# Patient Record
Sex: FEMALE | Race: WHITE | NOT HISPANIC OR LATINO | Employment: OTHER | URBAN - METROPOLITAN AREA
[De-identification: names, ages, dates, MRNs, and addresses within clinical notes are randomized per-mention and may not be internally consistent; named-entity substitution may affect disease eponyms.]

---

## 2020-05-29 ENCOUNTER — OFFICE VISIT (OUTPATIENT)
Dept: FAMILY MEDICINE CLINIC | Facility: CLINIC | Age: 60
End: 2020-05-29

## 2020-05-29 VITALS
OXYGEN SATURATION: 98 % | SYSTOLIC BLOOD PRESSURE: 140 MMHG | RESPIRATION RATE: 16 BRPM | WEIGHT: 147 LBS | TEMPERATURE: 97.8 F | BODY MASS INDEX: 22.28 KG/M2 | HEART RATE: 94 BPM | DIASTOLIC BLOOD PRESSURE: 80 MMHG | HEIGHT: 68 IN

## 2020-05-29 DIAGNOSIS — L72.3 INFECTED SEBACEOUS CYST: Primary | ICD-10-CM

## 2020-05-29 DIAGNOSIS — L08.9 INFECTED SEBACEOUS CYST: Primary | ICD-10-CM

## 2020-05-29 PROCEDURE — 99204 OFFICE O/P NEW MOD 45 MIN: CPT | Performed by: INTERNAL MEDICINE

## 2020-05-29 PROCEDURE — 10060 I&D ABSCESS SIMPLE/SINGLE: CPT | Performed by: INTERNAL MEDICINE

## 2020-05-29 RX ORDER — CEPHALEXIN 500 MG/1
500 CAPSULE ORAL 3 TIMES DAILY
Qty: 30 CAPSULE | Refills: 0 | OUTPATIENT
Start: 2020-05-29 | End: 2021-04-23

## 2020-05-29 NOTE — PROGRESS NOTES
Subjective   Mya Bradford is a 60 y.o. female. Patient is here today for establishing care again as a new patient.  She has 2 complaints.  She has had a couple of episodes of a sharp sudden approximately 30 second pain high up in the right abdomen at the costochondral junction with no nausea vomiting anorexia or other GI symptoms.  Secondly she has a sore lesion on her back.  Otherwise she has been feeling healthy and has not seen a doctor for a while.  She is on no regular medications  PMH she has a history of uterine myomectomy for fibroids in  and no other operations or hospitalizations.  FH-patient's parents are both living, father 85 with hypertension and mother 87 with thyroid problems 1 sister  of unknown causes no other family diseases  SH-patient is single is never used tobacco products and has an occasional alcoholic drink.  She is a   Chief Complaint   Patient presents with   • Flank Pain     right side          Vitals:    20 0931   BP: 140/80   Pulse: 94   Resp: 16   Temp: 97.8 °F (36.6 °C)   SpO2: 98%     Body mass index is 22.68 kg/m².  The following portions of the patient's history were reviewed and updated as appropriate: allergies, current medications, past family history, past medical history, past social history, past surgical history and problem list.    No past medical history on file.   Allergies   Allergen Reactions   • Erythromycin Swelling      Social History     Socioeconomic History   • Marital status: Single     Spouse name: Not on file   • Number of children: Not on file   • Years of education: Not on file   • Highest education level: Not on file   Tobacco Use   • Smoking status: Former Smoker   • Tobacco comment: Many years ago   Substance and Sexual Activity   • Alcohol use: Yes     Comment: socially   • Drug use: Defer   • Sexual activity: Defer        Current Outpatient Medications:   •  Chlorphen-Pseudoephed-APAP (TYLENOL ALLERGY SINUS PO),  Take  by mouth., Disp: , Rfl:   •  fluticasone (FLONASE) 50 MCG/ACT nasal spray, 2 sprays into the nostril(s) as directed by provider Daily. 2 puffs each nostril, Disp: 1 bottle, Rfl: 0  •  Loratadine (CLARITIN) 10 MG capsule, Take 10 mg by mouth Daily As Needed (congestion)., Disp: 30 each, Rfl: 0  •  terbinafine (lamiSIL) 250 MG tablet, Take 250 mg by mouth Daily., Disp: , Rfl:      Objective     History of Present Illness     Review of Systems   Constitutional: Negative.    HENT: Negative.    Respiratory: Negative.    Cardiovascular: Negative.    Gastrointestinal: Negative.         Intermittent very short-lived sharp pain in the very upper right abdomen at the costol margin   Genitourinary: Negative.    Musculoskeletal: Negative.    Skin:        Infected sebaceous cyst in the left back   Neurological: Negative.    Psychiatric/Behavioral: Negative.        Physical Exam   Constitutional: She is oriented to person, place, and time. She appears well-developed and well-nourished.   Pleasant, cooperative no acute distress, blood pressure 130/80   HENT:   Head: Normocephalic and atraumatic.   Eyes: Conjunctivae are normal. No scleral icterus.   Neck: Normal range of motion. Neck supple.   Cardiovascular: Normal rate, regular rhythm and normal heart sounds.   Pulmonary/Chest: Effort normal and breath sounds normal. No respiratory distress. She has no wheezes. She has no rales.   Abdominal: Soft. Bowel sounds are normal. She exhibits no distension and no mass. There is no tenderness. There is no rebound and no guarding.   Musculoskeletal: Normal range of motion. She exhibits no edema.   Neurological: She is alert and oriented to person, place, and time.   Skin: Skin is warm and dry.   There is a 1 cm inflamed sebaceous cyst in the left paraspinal area of thoracic back with a couple of areas of pustules forming on the surface   Psychiatric: She has a normal mood and affect. Her behavior is normal.   Nursing note and vitals  reviewed.    Incision & Drainage  Date/Time: 5/29/2020 10:13 AM  Performed by: Lizandro Mondragon MD  Authorized by: Lizandro Mondragon MD   Consent: Verbal consent obtained.  Consent given by: patient  Patient understanding: patient states understanding of the procedure being performed  Type: abscess  Body area: trunk  Location details: back  Anesthesia: local infiltration    Anesthesia:  Local Anesthetic: lidocaine 1% with epinephrine  Anesthetic total: 1 mL    Sedation:  Patient sedated: no    Scalpel size: 11  Incision type: single straight  Drainage: purulent and  bloody  Drainage amount: copious  Wound treatment: wound left open  Comments: Infected sebaceous cyst in the left thoracic area.  Infiltrated with anesthesia, incision with an 11 blade 1 cm with copious amounts of waxy purulent somewhat bloody material expressed.  The patient tolerated the procedure well.  The cyst was well evacuated with pressure and was covered with a bandage.        ASSESSMENT #1-new patient with an infected sebaceous cyst on the back, successfully drained by I&D  #2-intermittent sharp right upper abdominal pain, probably muscular spasm     Problem List Items Addressed This Visit        Musculoskeletal and Integument    Infected sebaceous cyst - Primary          PLAN a wound culture is sent and I am starting the patient on Keflex 500 mg 3 times a day for 10 days.  She does have a dermatology appointment already scheduled.  I recommended to the patient that she consider setting up for a complete physical exam which should include an EKG, CBC, CMP, lipid panel, TSH and free T4 and urinalysis    There are no Patient Instructions on file for this visit.  No follow-ups on file.

## 2020-06-02 LAB
BACTERIA SPEC ANAEROBE CULT: ABNORMAL
BACTERIA SPEC CULT: ABNORMAL

## 2021-09-14 ENCOUNTER — OFFICE VISIT (OUTPATIENT)
Dept: FAMILY MEDICINE CLINIC | Facility: CLINIC | Age: 61
End: 2021-09-14

## 2021-09-14 VITALS
OXYGEN SATURATION: 98 % | WEIGHT: 147.8 LBS | BODY MASS INDEX: 23.2 KG/M2 | TEMPERATURE: 97.4 F | DIASTOLIC BLOOD PRESSURE: 70 MMHG | HEIGHT: 67 IN | HEART RATE: 81 BPM | SYSTOLIC BLOOD PRESSURE: 136 MMHG

## 2021-09-14 DIAGNOSIS — Z12.11 COLON CANCER SCREENING: ICD-10-CM

## 2021-09-14 DIAGNOSIS — Z00.00 ROUTINE GENERAL MEDICAL EXAMINATION AT A HEALTH CARE FACILITY: Primary | ICD-10-CM

## 2021-09-14 DIAGNOSIS — Z13.6 SCREENING FOR ISCHEMIC HEART DISEASE: ICD-10-CM

## 2021-09-14 DIAGNOSIS — Z23 NEED FOR PNEUMOCOCCAL VACCINATION: ICD-10-CM

## 2021-09-14 DIAGNOSIS — Z13.0 SCREENING FOR IRON DEFICIENCY ANEMIA: ICD-10-CM

## 2021-09-14 DIAGNOSIS — Z78.0 POST-MENOPAUSAL: ICD-10-CM

## 2021-09-14 DIAGNOSIS — Z13.1 SCREENING FOR DIABETES MELLITUS: ICD-10-CM

## 2021-09-14 PROCEDURE — 99396 PREV VISIT EST AGE 40-64: CPT | Performed by: NURSE PRACTITIONER

## 2021-09-14 PROCEDURE — 90732 PPSV23 VACC 2 YRS+ SUBQ/IM: CPT | Performed by: NURSE PRACTITIONER

## 2021-09-14 PROCEDURE — 90471 IMMUNIZATION ADMIN: CPT | Performed by: NURSE PRACTITIONER

## 2021-09-14 NOTE — PROGRESS NOTES
"Chief Complaint  Annual Exam (Patient is wanting to establish primary care. She is fasting to have her labs drawn ( wore mask and goggles) )    Subjective          Mya Bradford presents to [facility]  History of Present Illness  New patient to me today    Well Adult Physical: Patient here for a comprehensive physical exam.The patient reports no problems  Do you take any herbs or supplements that were not prescribed by a doctor? no Are you taking calcium supplements? no Are you taking aspirin daily? no      Objective   Vital Signs:   /70   Pulse 81   Temp 97.4 °F (36.3 °C)   Ht 170.2 cm (67.01\")   Wt 67 kg (147 lb 12.8 oz)   SpO2 98%   BMI 23.14 kg/m²     Physical Exam  Vitals reviewed.   Constitutional:       General: She is not in acute distress.     Appearance: She is well-developed. She is not diaphoretic.   HENT:      Head: Normocephalic and atraumatic. Hair is normal.      Right Ear: Hearing, tympanic membrane, ear canal and external ear normal. No decreased hearing noted. No drainage.      Left Ear: Hearing, tympanic membrane, ear canal and external ear normal. No decreased hearing noted.      Nose: No nasal deformity.   Eyes:      General: Lids are normal.         Right eye: No discharge.         Left eye: No discharge.      Conjunctiva/sclera: Conjunctivae normal.      Pupils: Pupils are equal, round, and reactive to light.   Neck:      Thyroid: No thyromegaly.      Vascular: No JVD.      Trachea: No tracheal deviation.   Cardiovascular:      Rate and Rhythm: Normal rate and regular rhythm.      Pulses: Normal pulses.      Heart sounds: Normal heart sounds. No murmur heard.   No friction rub. No gallop.    Pulmonary:      Effort: Pulmonary effort is normal. No respiratory distress.      Breath sounds: Normal breath sounds. No wheezing or rales.   Chest:      Chest wall: No tenderness.   Abdominal:      General: Bowel sounds are normal. There is no distension. "      Palpations: Abdomen is soft. There is no mass.      Tenderness: There is no abdominal tenderness. There is no guarding or rebound.      Hernia: No hernia is present.   Musculoskeletal:         General: No tenderness or deformity. Normal range of motion.      Cervical back: Normal range of motion and neck supple.   Lymphadenopathy:      Cervical: No cervical adenopathy.   Skin:     General: Skin is warm and dry.      Findings: No erythema or rash.   Neurological:      Mental Status: She is alert and oriented to person, place, and time.      Cranial Nerves: No cranial nerve deficit.      Motor: No abnormal muscle tone.      Coordination: Coordination normal.      Deep Tendon Reflexes: Reflexes are normal and symmetric. Reflexes normal.   Psychiatric:         Behavior: Behavior normal.         Thought Content: Thought content normal.         Judgment: Judgment normal.        Result Review :                 Assessment and Plan    Diagnoses and all orders for this visit:    1. Routine general medical examination at a health care facility (Primary)    2. Screening for iron deficiency anemia  -     CBC & Differential    3. Screening for diabetes mellitus  -     Comprehensive Metabolic Panel    4. Screening for ischemic heart disease  -     Lipid Panel With LDL / HDL Ratio    5. Colon cancer screening  -     Cologuard - Stool, Per Rectum; Future    6. Post-menopausal  -     DEXA Bone Density Axial        Follow Up   Return if symptoms worsen or fail to improve.  Patient was given instructions and counseling regarding her condition or for health maintenance advice. Please see specific information pulled into the AVS if appropriate.     Discussed weight, diet and exercise  Follow up after labs  Make appointment of pap  Mask and googles worn

## 2021-09-15 LAB
ALBUMIN SERPL-MCNC: 4.8 G/DL (ref 3.5–5.2)
ALBUMIN/GLOB SERPL: 2.4 G/DL
ALP SERPL-CCNC: 78 U/L (ref 39–117)
ALT SERPL-CCNC: 14 U/L (ref 1–33)
AST SERPL-CCNC: 15 U/L (ref 1–32)
BASOPHILS # BLD AUTO: 0.06 10*3/MM3 (ref 0–0.2)
BASOPHILS NFR BLD AUTO: 0.7 % (ref 0–1.5)
BILIRUB SERPL-MCNC: 0.5 MG/DL (ref 0–1.2)
BUN SERPL-MCNC: 13 MG/DL (ref 6–20)
BUN/CREAT SERPL: 19.7 (ref 7–25)
CALCIUM SERPL-MCNC: 9.7 MG/DL (ref 8.6–10.5)
CHLORIDE SERPL-SCNC: 103 MMOL/L (ref 98–107)
CHOLEST SERPL-MCNC: 231 MG/DL (ref 0–200)
CO2 SERPL-SCNC: 24.3 MMOL/L (ref 22–29)
CREAT SERPL-MCNC: 0.66 MG/DL (ref 0.57–1)
EOSINOPHIL # BLD AUTO: 0.09 10*3/MM3 (ref 0–0.4)
EOSINOPHIL NFR BLD AUTO: 1 % (ref 0.3–6.2)
ERYTHROCYTE [DISTWIDTH] IN BLOOD BY AUTOMATED COUNT: 12.2 % (ref 12.3–15.4)
GLOBULIN SER CALC-MCNC: 2 GM/DL
GLUCOSE SERPL-MCNC: 103 MG/DL (ref 65–99)
HCT VFR BLD AUTO: 42.1 % (ref 34–46.6)
HDLC SERPL-MCNC: 78 MG/DL (ref 40–60)
HGB BLD-MCNC: 14.7 G/DL (ref 12–15.9)
IMM GRANULOCYTES # BLD AUTO: 0.03 10*3/MM3 (ref 0–0.05)
IMM GRANULOCYTES NFR BLD AUTO: 0.3 % (ref 0–0.5)
LDLC SERPL CALC-MCNC: 138 MG/DL (ref 0–100)
LDLC/HDLC SERPL: 1.74 {RATIO}
LYMPHOCYTES # BLD AUTO: 2.66 10*3/MM3 (ref 0.7–3.1)
LYMPHOCYTES NFR BLD AUTO: 30.3 % (ref 19.6–45.3)
MCH RBC QN AUTO: 31.2 PG (ref 26.6–33)
MCHC RBC AUTO-ENTMCNC: 34.9 G/DL (ref 31.5–35.7)
MCV RBC AUTO: 89.4 FL (ref 79–97)
MONOCYTES # BLD AUTO: 0.72 10*3/MM3 (ref 0.1–0.9)
MONOCYTES NFR BLD AUTO: 8.2 % (ref 5–12)
NEUTROPHILS # BLD AUTO: 5.21 10*3/MM3 (ref 1.7–7)
NEUTROPHILS NFR BLD AUTO: 59.5 % (ref 42.7–76)
NRBC BLD AUTO-RTO: 0 /100 WBC (ref 0–0.2)
PLATELET # BLD AUTO: 347 10*3/MM3 (ref 140–450)
POTASSIUM SERPL-SCNC: 4.8 MMOL/L (ref 3.5–5.2)
PROT SERPL-MCNC: 6.8 G/DL (ref 6–8.5)
RBC # BLD AUTO: 4.71 10*6/MM3 (ref 3.77–5.28)
SODIUM SERPL-SCNC: 142 MMOL/L (ref 136–145)
TRIGL SERPL-MCNC: 86 MG/DL (ref 0–150)
VLDLC SERPL CALC-MCNC: 15 MG/DL (ref 5–40)
WBC # BLD AUTO: 8.77 10*3/MM3 (ref 3.4–10.8)

## 2021-11-09 ENCOUNTER — APPOINTMENT (OUTPATIENT)
Dept: MAMMOGRAPHY | Facility: HOSPITAL | Age: 61
End: 2021-11-09

## 2022-03-10 ENCOUNTER — HOSPITAL ENCOUNTER (OUTPATIENT)
Dept: BONE DENSITY | Facility: HOSPITAL | Age: 62
Discharge: HOME | End: 2022-03-10
Admitting: NURSE PRACTITIONER

## 2022-03-10 PROCEDURE — 77080 DXA BONE DENSITY AXIAL: CPT

## 2024-06-10 ENCOUNTER — PATIENT ROUNDING (BHMG ONLY) (OUTPATIENT)
Dept: URGENT CARE | Facility: CLINIC | Age: 64
End: 2024-06-10
Payer: COMMERCIAL

## 2024-06-10 ENCOUNTER — TELEPHONE (OUTPATIENT)
Dept: FAMILY MEDICINE CLINIC | Facility: CLINIC | Age: 64
End: 2024-06-10
Payer: COMMERCIAL

## 2024-06-10 NOTE — TELEPHONE ENCOUNTER
I called and informed the patient of mixed demetris that grew on the culture specimen that was collected from June 5 urgent care visit.  Patient reports she has been taking the Bactrim antibiotic as prescribed.  Patient reports the rash is less purple and seems to be fading.    I informed the patient to follow-up with primary care provider or go back to urgent care if the rash does not resolve completely upon completion of the antibiotics, patient was understanding.

## 2024-06-10 NOTE — ED NOTES
Thank you for letting us care for you in your recent visit to our [facility] [place]. We would love to hear about your experience with us.    We’re always looking for ways to make our patients’ experiences even better. Do you have any recommendations on ways we may improve?     I appreciate you taking the time to respond. Please be on the lookout for a survey about your recent visit from Raghu Judge via text or email. We would greatly appreciate if you could fill that out and turn it back in. We want your voice to be heard and we value your feedback.   Thank you for choosing [facility] for your healthcare needs.     Sahil García RN/Practice Manager